# Patient Record
Sex: MALE | Race: WHITE | NOT HISPANIC OR LATINO | Employment: FULL TIME | URBAN - METROPOLITAN AREA
[De-identification: names, ages, dates, MRNs, and addresses within clinical notes are randomized per-mention and may not be internally consistent; named-entity substitution may affect disease eponyms.]

---

## 2024-04-21 ENCOUNTER — HOSPITAL ENCOUNTER (EMERGENCY)
Facility: MEDICAL CENTER | Age: 59
End: 2024-04-21
Attending: EMERGENCY MEDICINE
Payer: COMMERCIAL

## 2024-04-21 ENCOUNTER — PHARMACY VISIT (OUTPATIENT)
Dept: PHARMACY | Facility: MEDICAL CENTER | Age: 59
End: 2024-04-21
Payer: COMMERCIAL

## 2024-04-21 VITALS
HEIGHT: 68 IN | TEMPERATURE: 97.8 F | SYSTOLIC BLOOD PRESSURE: 140 MMHG | DIASTOLIC BLOOD PRESSURE: 80 MMHG | RESPIRATION RATE: 18 BRPM | WEIGHT: 187.39 LBS | BODY MASS INDEX: 28.4 KG/M2 | HEART RATE: 74 BPM | OXYGEN SATURATION: 97 %

## 2024-04-21 DIAGNOSIS — H20.00 ACUTE ANTERIOR UVEITIS: ICD-10-CM

## 2024-04-21 LAB
BASOPHILS # BLD AUTO: 0.8 % (ref 0–1.8)
BASOPHILS # BLD: 0.04 K/UL (ref 0–0.12)
CRP SERPL HS-MCNC: <0.3 MG/DL (ref 0–0.75)
EOSINOPHIL # BLD AUTO: 0.05 K/UL (ref 0–0.51)
EOSINOPHIL NFR BLD: 0.9 % (ref 0–6.9)
ERYTHROCYTE [DISTWIDTH] IN BLOOD BY AUTOMATED COUNT: 42.7 FL (ref 35.9–50)
ERYTHROCYTE [SEDIMENTATION RATE] IN BLOOD BY WESTERGREN METHOD: 18 MM/HOUR (ref 0–20)
HCT VFR BLD AUTO: 43.1 % (ref 42–52)
HGB BLD-MCNC: 14.5 G/DL (ref 14–18)
IMM GRANULOCYTES # BLD AUTO: 0.01 K/UL (ref 0–0.11)
IMM GRANULOCYTES NFR BLD AUTO: 0.2 % (ref 0–0.9)
LYMPHOCYTES # BLD AUTO: 1.69 K/UL (ref 1–4.8)
LYMPHOCYTES NFR BLD: 31.9 % (ref 22–41)
MCH RBC QN AUTO: 30.1 PG (ref 27–33)
MCHC RBC AUTO-ENTMCNC: 33.6 G/DL (ref 32.3–36.5)
MCV RBC AUTO: 89.4 FL (ref 81.4–97.8)
MONOCYTES # BLD AUTO: 0.31 K/UL (ref 0–0.85)
MONOCYTES NFR BLD AUTO: 5.8 % (ref 0–13.4)
NEUTROPHILS # BLD AUTO: 3.2 K/UL (ref 1.82–7.42)
NEUTROPHILS NFR BLD: 60.4 % (ref 44–72)
NRBC # BLD AUTO: 0 K/UL
NRBC BLD-RTO: 0 /100 WBC (ref 0–0.2)
PLATELET # BLD AUTO: 196 K/UL (ref 164–446)
PMV BLD AUTO: 9.7 FL (ref 9–12.9)
RBC # BLD AUTO: 4.82 M/UL (ref 4.7–6.1)
WBC # BLD AUTO: 5.3 K/UL (ref 4.8–10.8)

## 2024-04-21 PROCEDURE — 99284 EMERGENCY DEPT VISIT MOD MDM: CPT

## 2024-04-21 PROCEDURE — 85652 RBC SED RATE AUTOMATED: CPT

## 2024-04-21 PROCEDURE — 85025 COMPLETE CBC W/AUTO DIFF WBC: CPT

## 2024-04-21 PROCEDURE — RXMED WILLOW AMBULATORY MEDICATION CHARGE

## 2024-04-21 PROCEDURE — 36415 COLL VENOUS BLD VENIPUNCTURE: CPT

## 2024-04-21 PROCEDURE — 700101 HCHG RX REV CODE 250: Performed by: EMERGENCY MEDICINE

## 2024-04-21 PROCEDURE — 86140 C-REACTIVE PROTEIN: CPT

## 2024-04-21 RX ORDER — CYCLOPENTOLATE HYDROCHLORIDE 10 MG/ML
SOLUTION/ DROPS OPHTHALMIC
Qty: 5 ML | Refills: 1 | OUTPATIENT
Start: 2024-04-20

## 2024-04-21 RX ORDER — PROPARACAINE HYDROCHLORIDE 5 MG/ML
1 SOLUTION/ DROPS OPHTHALMIC ONCE
Status: COMPLETED | OUTPATIENT
Start: 2024-04-21 | End: 2024-04-21

## 2024-04-21 RX ADMIN — PROPARACAINE HYDROCHLORIDE 1 DROP: 5 SOLUTION/ DROPS OPHTHALMIC at 11:00

## 2024-04-21 RX ADMIN — FLUORESCEIN SODIUM 1 MG: 1 STRIP OPHTHALMIC at 11:00

## 2024-04-21 ASSESSMENT — PAIN DESCRIPTION - PAIN TYPE: TYPE: ACUTE PAIN

## 2024-04-21 NOTE — ED TRIAGE NOTES
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 Pt reports right eye/swelling/blurred vision. Pt taking prednisolone/acetate/ophthalmic for 1 wk.      Explained to pt triage process, made pt aware to tell this RN/staff of any changes/concerns, pt verbalized understanding of process and instructions given. Pt to ER venkatesh.

## 2024-04-21 NOTE — ED NOTES
Patient Identifiers verified; patient ambulated to room with a steady gait; Eye Equipment at bedside; visual acuity charted; charted the Patient for an Emergency Room Physician to see.

## 2024-04-21 NOTE — DISCHARGE INSTRUCTIONS
Use your Pred forte drops every 2 hours while awake.  Begin using the cyclopentolate 3 times a day.  Follow-up with ophthalmologist Dr. Fernandez at [facility] tomorrow morning at 7:30 AM.

## 2024-04-21 NOTE — ED NOTES
Discharge education provided by ERP. Discharge paperwork reviewed with patient. Understands the recommended follow-up care/appointment. All questions answered. All belongings with patient. Patient ambulated to lobby unassisted with steady gait.

## 2024-04-21 NOTE — ED PROVIDER NOTES
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 Pt reports right eye/swelling/blurred vision. Pt taking prednisolone/acetate/ophthalmic for 1 wk.        EXTERNAL RECORDS REVIEWED  Other from out of town no notes available    HPI/ROS  LIMITATION TO HISTORY   Select: : None  OUTSIDE HISTORIAN(S):      Ignacio Laughlin is a 58 y.o. male who presents to the emergency department with red right eye/blurry vision right eye.  Patient states that he has traveled here recently from [place].  At the [facility] 1 week prior patient was diagnosed with acute anterior uveitis.  He was started on Cyclogyl and Pred forte.  Patient has been taking the Pred forte as directed but was unable to get the Cyclogyl.  He was able to fill this prescription this morning but has not taken any of it.  He reports that his vision has continued to get worse he was having pain in the eye yesterday and has had worsening blurry vision in his right eye only today.  He denies headache at this time he denies jaw claudication he denies temporal pain.  He reports slight hypertension and hyperlipidemia no other acute medical problems.    PAST MEDICAL HISTORY   Hypertension, hyperlipidemia    SURGICAL HISTORY  patient denies any surgical history    FAMILY HISTORY  History reviewed. No pertinent family history.    SOCIAL HISTORY  Social History     Tobacco Use    Smoking status: Never    Smokeless tobacco: Never   Substance and Sexual Activity    Alcohol use: Yes     Comment: social    Drug use: Never    Sexual activity: Not on file       CURRENT MEDICATIONS  Home Medications       Reviewed by Juan Clark R.N. (Registered Nurse) on 04/21/24 at 1005  Med List Status: Not Addressed     Medication Last Dose Status   cyclopentolate (CYCLOGYL) 1 % Solution  Active                    ALLERGIES  No Known Allergies    PHYSICAL EXAM  VITAL  "SIGNS: BP (!) 143/83   Pulse 72   Temp 36.6 °C (97.9 °F) (Temporal)   Resp 18   Ht 1.727 m (5' 8\")   Wt 85 kg (187 lb 6.3 oz)   SpO2 98%   BMI 28.49 kg/m²      Pulse ox interpretation: I interpret this pulse ox as normal.    Constitutional: Alert and oriented x 3, minimal distress  HEENT: Atraumatic normocephalic,    The right eyes shows acute scleral injection encroaching on the iris.  There is no conjunctival erythema.  The anterior chamber appears normal depth but the pupil appears clouded.  Patient is count fingers OD, OS 20/15 right eye intraocular pressure is 9.8 mmHg    The nares is clear, external ears are normal, mouth shows moist mucous membranes normal dentition for age  Neck: Supple, no JVD no tracheal deviation  Cardiovascular: Regular rate and rhythm   Thorax & Lungs: No respiratory distress    Skin: Warm dry no acute rash or lesion    Neurologic: Cranial nerves III through XII are grossly intact   Psychiatric: Appropriate affect for situation at this time        EKG/LABS  Results for orders placed or performed during the hospital encounter of 04/21/24   Sed Rate   Result Value Ref Range    Sed Rate Westergren 18 0 - 20 mm/hour   CRP QUANTITIVE (NON-CARDIAC)   Result Value Ref Range    Stat C-Reactive Protein <0.30 0.00 - 0.75 mg/dL   CBC WITH DIFFERENTIAL   Result Value Ref Range    WBC 5.3 4.8 - 10.8 K/uL    RBC 4.82 4.70 - 6.10 M/uL    Hemoglobin 14.5 14.0 - 18.0 g/dL    Hematocrit 43.1 42.0 - 52.0 %    MCV 89.4 81.4 - 97.8 fL    MCH 30.1 27.0 - 33.0 pg    MCHC 33.6 32.3 - 36.5 g/dL    RDW 42.7 35.9 - 50.0 fL    Platelet Count 196 164 - 446 K/uL    MPV 9.7 9.0 - 12.9 fL    Neutrophils-Polys 60.40 44.00 - 72.00 %    Lymphocytes 31.90 22.00 - 41.00 %    Monocytes 5.80 0.00 - 13.40 %    Eosinophils 0.90 0.00 - 6.90 %    Basophils 0.80 0.00 - 1.80 %    Immature Granulocytes 0.20 0.00 - 0.90 %    Nucleated RBC 0.00 0.00 - 0.20 /100 WBC    Neutrophils (Absolute) 3.20 1.82 - 7.42 K/uL    Lymphs " "(Absolute) 1.69 1.00 - 4.80 K/uL    Monos (Absolute) 0.31 0.00 - 0.85 K/uL    Eos (Absolute) 0.05 0.00 - 0.51 K/uL    Baso (Absolute) 0.04 0.00 - 0.12 K/uL    Immature Granulocytes (abs) 0.01 0.00 - 0.11 K/uL    NRBC (Absolute) 0.00 K/uL      I have independently interpreted this EKG    RADIOLOGY/PROCEDURES   I have independently interpreted the diagnostic imaging associated with this visit and am waiting the final reading from the radiologist.     COURSE & MEDICAL DECISION MAKING    ASSESSMENT, COURSE AND PLAN  Care Narrative: Normal intraocular pressure patient does have diminished vision however has previous diagnosis of anterior uveitis.  Inflammatory markers are unremarkable no jaw claudication no other concern for temporal arteritis.  I discussed the case with ophthalmologist Dr. Fernandez who advises Pred forte to be continued every 2 hours while awake.  Cyclogyl to be placed 3 times daily beginning now.  Patient will follow-up in the [facility] ophthalmology clinic tomorrow morning at 7:30 AM.  Patient understands that he is to return here for worsening pain worsening vision any other acute symptom change or concern he is otherwise discharged in stable and improved condition.        ADDITIONAL PROBLEMS MANAGED    DISPOSITION AND DISCUSSIONS    I have discussed management of the patient with the following physicians and JULIO's:    MD Rebecca hemology    Discussion of management with other [facility] or appropriate source(s):      Escalation of care considered, and ultimately not performed:    Barriers to care at this time, including but not limited to: .     Decision tools and prescription drugs considered including, but not limited to: .  BP (!) 140/80   Pulse 74   Temp 36.6 °C (97.8 °F) (Temporal)   Resp 18   Ht 1.727 m (5' 8\")   Wt 85 kg (187 lb 6.3 oz)   SpO2 97%   BMI 28.49 kg/m²     [facility]  [address] [place] [id number] [phone]  Go on 4/22/2024  At 7:30 AM    [facility]" [facility], Emergency Dept  [facility] [facility] [id number] [phone]    in 12-24 hours if symptoms persist, immediately If symptoms worsen, or if you develop any other symptoms or concerns      FINAL DIAGNOSIS  1. Acute anterior uveitis Active          Electronically signed by: Buddy Max M.D.,